# Patient Record
Sex: FEMALE | Race: BLACK OR AFRICAN AMERICAN | URBAN - METROPOLITAN AREA
[De-identification: names, ages, dates, MRNs, and addresses within clinical notes are randomized per-mention and may not be internally consistent; named-entity substitution may affect disease eponyms.]

---

## 2021-06-17 ENCOUNTER — OFFICE VISIT (OUTPATIENT)
Dept: URBAN - METROPOLITAN AREA CLINIC 84 | Facility: CLINIC | Age: 66
End: 2021-06-17

## 2021-07-16 ENCOUNTER — WEB ENCOUNTER (OUTPATIENT)
Dept: URBAN - METROPOLITAN AREA CLINIC 84 | Facility: CLINIC | Age: 66
End: 2021-07-16

## 2021-07-16 ENCOUNTER — OFFICE VISIT (OUTPATIENT)
Dept: URBAN - METROPOLITAN AREA CLINIC 84 | Facility: CLINIC | Age: 66
End: 2021-07-16
Payer: MEDICARE

## 2021-07-16 DIAGNOSIS — K21.9 GERD: ICD-10-CM

## 2021-07-16 DIAGNOSIS — Z85.038 PERSONAL HISTORY OF COLON CANCER: ICD-10-CM

## 2021-07-16 DIAGNOSIS — R13.10 DYSPHAGIA: ICD-10-CM

## 2021-07-16 PROCEDURE — 99214 OFFICE O/P EST MOD 30 MIN: CPT | Performed by: INTERNAL MEDICINE

## 2021-07-16 RX ORDER — ASPIRIN 81 MG/1
1 TABLET TABLET, COATED ORAL ONCE A DAY
Status: ACTIVE | COMMUNITY

## 2021-07-16 NOTE — HPI-TODAY'S VISIT:
Patient last seen 11/2019 for colonsocopy.  This was normal with a 5 year recall.  She ahs dysphagia to pills for about 4 months.  She has GERD. She is on Protonix BID>  This helps her GERD and epigastric burn.  She denies N/V. SHe has supine rgurgitation.  She denies dysphagia to food, only to her pills.  She does sometimes feel like herfood is slow going down.  She denies anorexia.  She ahs lost weight.  According to the patient her TSH was abnormal and adjustments were made on her meds and her weight has been stable. She ahs regular BM's.  SHe denies LGI bleed. She has ahd her thyroid removed

## 2021-08-24 ENCOUNTER — TELEPHONE ENCOUNTER (OUTPATIENT)
Dept: URBAN - METROPOLITAN AREA CLINIC 92 | Facility: CLINIC | Age: 66
End: 2021-08-24

## 2021-08-24 ENCOUNTER — OFFICE VISIT (OUTPATIENT)
Dept: URBAN - METROPOLITAN AREA SURGERY CENTER 20 | Facility: SURGERY CENTER | Age: 66
End: 2021-08-24
Payer: MEDICARE

## 2021-08-24 DIAGNOSIS — K22.8 COLUMNAR-LINED ESOPHAGUS: ICD-10-CM

## 2021-08-24 DIAGNOSIS — K29.30 CHRONIC SUPERFICIAL GASTRITIS: ICD-10-CM

## 2021-08-24 PROCEDURE — 43239 EGD BIOPSY SINGLE/MULTIPLE: CPT | Performed by: INTERNAL MEDICINE

## 2021-08-24 PROCEDURE — G8907 PT DOC NO EVENTS ON DISCHARG: HCPCS | Performed by: INTERNAL MEDICINE

## 2021-08-24 RX ORDER — ASPIRIN 81 MG/1
1 TABLET TABLET, COATED ORAL ONCE A DAY
Status: ACTIVE | COMMUNITY

## 2021-08-24 RX ORDER — NYSTATIN 100000 [USP'U]/ML
5 ML SUSPENSION ORAL
Qty: 280 ML | Refills: 0 | OUTPATIENT
Start: 2021-08-24 | End: 2021-09-07

## 2022-01-21 ENCOUNTER — OFFICE VISIT (OUTPATIENT)
Dept: URBAN - METROPOLITAN AREA CLINIC 84 | Facility: CLINIC | Age: 67
End: 2022-01-21
Payer: MEDICARE

## 2022-01-21 DIAGNOSIS — K21.9 GERD: ICD-10-CM

## 2022-01-21 DIAGNOSIS — R13.10 DYSPHAGIA: ICD-10-CM

## 2022-01-21 DIAGNOSIS — R63.4 WEIGHT LOSS: ICD-10-CM

## 2022-01-21 DIAGNOSIS — Z85.038 PERSONAL HISTORY OF COLON CANCER: ICD-10-CM

## 2022-01-21 PROCEDURE — 99213 OFFICE O/P EST LOW 20 MIN: CPT | Performed by: INTERNAL MEDICINE

## 2022-01-21 RX ORDER — ASPIRIN 81 MG/1
1 TABLET TABLET, COATED ORAL ONCE A DAY
Status: ACTIVE | COMMUNITY

## 2022-01-21 NOTE — HPI-TODAY'S VISIT:
EGD in 8/2021 had signs of GERD.  There was a hint of Candida so we empirically treated with Nystatin S/S.  This was not present on the path results.  She has been having an unintentional weight loss.  She has lost about 20 pounds.  She ahs an appetite.  She is on Protonix BID.  She ahs mild mid-abdominal pain. She no longer has dysphagia.  SHe denies GERD/N/V.  She has early satiety.  She does not have DM and she is not on narcotics.  She as irregular BM's.  She denies LGI bleed or melena.  Her PCP recently had to raise her Synthroid dose.

## 2022-01-24 PROBLEM — 40739000 DYSPHAGIA: Status: ACTIVE | Noted: 2021-07-16

## 2022-04-07 ENCOUNTER — OFFICE VISIT (OUTPATIENT)
Dept: URBAN - METROPOLITAN AREA CLINIC 84 | Facility: CLINIC | Age: 67
End: 2022-04-07
Payer: MEDICARE

## 2022-04-07 DIAGNOSIS — Z85.038 PERSONAL HISTORY OF COLON CANCER: ICD-10-CM

## 2022-04-07 DIAGNOSIS — R63.4 WEIGHT LOSS: ICD-10-CM

## 2022-04-07 DIAGNOSIS — R68.81 EARLY SATIETY: ICD-10-CM

## 2022-04-07 DIAGNOSIS — K21.9 GERD: ICD-10-CM

## 2022-04-07 PROCEDURE — 99213 OFFICE O/P EST LOW 20 MIN: CPT | Performed by: INTERNAL MEDICINE

## 2022-04-07 RX ORDER — PANTOPRAZOLE SODIUM 40 MG/1
1 TABLET TABLET, DELAYED RELEASE ORAL ONCE A DAY
Status: ACTIVE | COMMUNITY

## 2022-04-07 RX ORDER — ASPIRIN 81 MG/1
1 TABLET TABLET, COATED ORAL ONCE A DAY
Status: ACTIVE | COMMUNITY

## 2022-04-07 NOTE — HPI-TODAY'S VISIT:
Patient seen 2 months ago.  CT Scan was normal.   Her weight is stable on our scale but she thinks that she is still losing weight.  She has a good appetite.  She denies abdominal pain.  She is taking Protonix BID.  She denies GERD/N/V/dysphagia.  She has early satiety.  Overall she ahs regular BM's.  She has formed stool.  SHe denies LGI bleed or melena.  She is scheduled to see her PCP next week at [facility] and she will have labs checked then

## 2022-04-12 ENCOUNTER — TELEPHONE ENCOUNTER (OUTPATIENT)
Dept: URBAN - METROPOLITAN AREA CLINIC 84 | Facility: CLINIC | Age: 67
End: 2022-04-12

## 2022-04-13 PROBLEM — 429699009: Status: ACTIVE | Noted: 2021-07-16

## 2022-04-13 PROBLEM — 235595009 GASTROESOPHAGEAL REFLUX DISEASE: Status: ACTIVE | Noted: 2021-07-16

## 2023-06-08 ENCOUNTER — OFFICE VISIT (OUTPATIENT)
Dept: URBAN - METROPOLITAN AREA CLINIC 84 | Facility: CLINIC | Age: 68
End: 2023-06-08

## 2023-06-29 ENCOUNTER — OFFICE VISIT (OUTPATIENT)
Dept: URBAN - METROPOLITAN AREA CLINIC 84 | Facility: CLINIC | Age: 68
End: 2023-06-29

## 2023-07-13 ENCOUNTER — DASHBOARD ENCOUNTERS (OUTPATIENT)
Age: 68
End: 2023-07-13

## 2023-07-13 ENCOUNTER — OFFICE VISIT (OUTPATIENT)
Dept: URBAN - METROPOLITAN AREA CLINIC 84 | Facility: CLINIC | Age: 68
End: 2023-07-13
Payer: MEDICARE

## 2023-07-13 VITALS
WEIGHT: 143 LBS | DIASTOLIC BLOOD PRESSURE: 75 MMHG | HEIGHT: 66 IN | TEMPERATURE: 97.7 F | BODY MASS INDEX: 22.98 KG/M2 | SYSTOLIC BLOOD PRESSURE: 129 MMHG | HEART RATE: 69 BPM

## 2023-07-13 DIAGNOSIS — K21.9 GERD: ICD-10-CM

## 2023-07-13 DIAGNOSIS — R68.81 EARLY SATIETY: ICD-10-CM

## 2023-07-13 DIAGNOSIS — R63.4 WEIGHT LOSS: ICD-10-CM

## 2023-07-13 DIAGNOSIS — Z85.038 PERSONAL HISTORY OF COLON CANCER: ICD-10-CM

## 2023-07-13 DIAGNOSIS — R13.10 DYSPHAGIA: ICD-10-CM

## 2023-07-13 PROCEDURE — 99214 OFFICE O/P EST MOD 30 MIN: CPT | Performed by: INTERNAL MEDICINE

## 2023-07-13 RX ORDER — PANTOPRAZOLE SODIUM 40 MG/1
1 TABLET TABLET, DELAYED RELEASE ORAL ONCE A DAY
Status: ACTIVE | COMMUNITY

## 2023-07-13 RX ORDER — ASPIRIN 81 MG/1
1 TABLET TABLET, COATED ORAL ONCE A DAY
Status: ACTIVE | COMMUNITY

## 2023-07-13 NOTE — HPI-TODAY'S VISIT:
Patient last seen 4/2022. GES was normal.  She is still taking Protonix daily.  She no longer has the sense of fullness.  She has a good appetite.  She thinks that her weight is fluctuating.  She denies GERD/N/V/dysphagia/early satiety.  She has irregular BM's.  She has good BM's when she eats a lot of vegetables.  She has a lot of back pain and per the patient MRI showed arthritis.  Her new PCP is Dr. Corral at [facility].  Outside labs from [facility] including TFT's, CBC, BMP, and LFT's were reviewed in clinic today